# Patient Record
Sex: FEMALE | Race: BLACK OR AFRICAN AMERICAN
[De-identification: names, ages, dates, MRNs, and addresses within clinical notes are randomized per-mention and may not be internally consistent; named-entity substitution may affect disease eponyms.]

---

## 2019-03-10 ENCOUNTER — HOSPITAL ENCOUNTER (EMERGENCY)
Dept: HOSPITAL 61 - ER | Age: 29
Discharge: HOME | End: 2019-03-10
Payer: MEDICAID

## 2019-03-10 VITALS — DIASTOLIC BLOOD PRESSURE: 92 MMHG | SYSTOLIC BLOOD PRESSURE: 128 MMHG

## 2019-03-10 VITALS — HEIGHT: 64 IN | BODY MASS INDEX: 28.34 KG/M2 | WEIGHT: 166 LBS

## 2019-03-10 DIAGNOSIS — Z3A.01: ICD-10-CM

## 2019-03-10 DIAGNOSIS — N88.8: ICD-10-CM

## 2019-03-10 DIAGNOSIS — O20.0: Primary | ICD-10-CM

## 2019-03-10 DIAGNOSIS — O34.81: ICD-10-CM

## 2019-03-10 LAB
APTT PPP: YELLOW S
BACTERIA #/AREA URNS HPF: (no result) /HPF
BILIRUB UR QL STRIP: NEGATIVE
ERYTHROCYTE [DISTWIDTH] IN BLOOD BY AUTOMATED COUNT: 13.1 % (ref 11.5–14.5)
FIBRINOGEN PPP-MCNC: CLEAR MG/DL
HCT VFR BLD CALC: 39.6 % (ref 36–47)
HGB BLD-MCNC: 13.5 G/DL (ref 12–15.5)
MCH RBC QN AUTO: 32 PG (ref 25–35)
MCHC RBC AUTO-ENTMCNC: 34 G/DL (ref 31–37)
MCV RBC AUTO: 93 FL (ref 79–100)
NITRITE UR QL STRIP: NEGATIVE
PH UR STRIP: 5.5 [PH]
PLATELET # BLD AUTO: 273 X10^3/UL (ref 140–400)
PROT UR STRIP-MCNC: NEGATIVE MG/DL
RBC # BLD AUTO: 4.25 X10^6/UL (ref 3.5–5.4)
RBC #/AREA URNS HPF: (no result) /HPF (ref 0–2)
SQUAMOUS #/AREA URNS LPF: (no result) /LPF
UROBILINOGEN UR-MCNC: 0.2 MG/DL
WBC # BLD AUTO: 7.2 X10^3/UL (ref 4–11)
WBC #/AREA URNS HPF: (no result) /HPF (ref 0–4)

## 2019-03-10 PROCEDURE — 85027 COMPLETE CBC AUTOMATED: CPT

## 2019-03-10 PROCEDURE — 84702 CHORIONIC GONADOTROPIN TEST: CPT

## 2019-03-10 PROCEDURE — 76801 OB US < 14 WKS SINGLE FETUS: CPT

## 2019-03-10 PROCEDURE — 81025 URINE PREGNANCY TEST: CPT

## 2019-03-10 PROCEDURE — 86900 BLOOD TYPING SEROLOGIC ABO: CPT

## 2019-03-10 PROCEDURE — 36415 COLL VENOUS BLD VENIPUNCTURE: CPT

## 2019-03-10 PROCEDURE — 81001 URINALYSIS AUTO W/SCOPE: CPT

## 2019-03-10 PROCEDURE — 86901 BLOOD TYPING SEROLOGIC RH(D): CPT

## 2019-03-10 NOTE — RAD
EXAM: Obstetrics sonogram.

 

HISTORY: Spotting and cramping.

 

TECHNIQUE: Transvaginal sonographic imaging of a gravid uterus was 

performed.

 

COMPARISON: None.

 

FINDINGS: The uterus measures 7.4 x 3.7 x 3.6 cm. There is a small 

intrauterine fluid collection with a mean diameter of 6.1 mm. This may be 

a gestational sac with mean sac diameter corresponding with a gestational 

age of 5 weeks and 2 days. No yolk sac or fetal pole is seen. The ovaries 

are normal in size and demonstrate normal blood flow. There is no pelvic 

free fluid. There is nabothian cyst within the cervix.

 

IMPRESSION:

1. Small fluid collection within the endometrial cavity likely due to an 

early gestational sac, corresponding with a gestational age of 5 weeks and

2 days. No yolk sac or fetal pole is seen at this early gestation. 

Short-term sonographic follow-up to confirm viability and exclude a 

pseudosac in the setting of ectopic gestation is recommended.

2. Small nabothian cyst within the cervix.

3. Otherwise, unremarkable pelvic sonogram.

 

Electronically signed by: Sharri Mejía MD (3/10/2019 7:01 PM) [facility]

## 2019-03-10 NOTE — PHYS DOC
Past Medical History


Past Medical History:  Other


Additional Past Medical Histor:  intersistal cystitis


 (ALEXANDER MATIAS)


Past Surgical History:  Other


Additional Past Surgical Histo:  laparotomy


 (ALEXANDER MATIAS)


Alcohol Use:  None


Drug Use:  None


 (ALEXANDER MATIAS)





Adult General


Chief Complaint


Chief Complaint:  VAGINAL BLEEDING PREGNANCY





HPI


HPI





Patient is a 28  year old AA female who presents to the ER with complaints of 

vaginal bleeding during pregnancy x 3 days. Pt states that she was evaluated on 

Thursday at [facility] for vaginal bleeding. Her beta HCG level 

was 2400 on that day and the gestational sac measured 5 weeks 2 days. Her LMP 

was on 1/25/19 and her EDC is 11/1/19. She is G3, P0, A2 with 2 previous 

miscarriages around 6-8 weeks gestation. She reports mild lower abdominal 

cramping currently 2/10 on the pain scale and denies any alleviating or 

exacerbating factors. Pt states her first OB appointment at [facility] is 

not until 3/19/19. She describes the bleeding and dark brown spotting with 

little clots. 


 (ALEXANDER MATIAS)





Review of Systems


Review of Systems





Constitutional: Denies fever or chills []


GI: Denies nausea, vomiting, or diarrhea; see HPI


: Denies dysuria or hematuria; see HPI


GYN: See HPI[]


Musculoskeletal: Denies back pain 


Integument: Denies rash or skin lesions []


Neurologic: Denies headache


 (ALEXANDER MATIAS)





Allergies


Allergies





Allergies








Coded Allergies Type Severity Reaction Last Updated Verified


 


  No Known Drug Allergies    1/30/15 No





 (RISHI ARAMBULA MD)





Physical Exam


Physical Exam





Constitutional: Well developed, well nourished, no acute distress, non-toxic 

appearance. []


HENT: Normocephalic, atraumatic, bilateral external ears normal, nose normal. []


Eyes: conjunctiva normal, no discharge. [] 


Lungs & Thorax: respirations even and unlabored, no retractions. 


 Pelvic Exam: 


 Abdomen:      soft, Nontender


 External Genitalia:   Normal Skin


 Speculum:      Normal vaginal mucosa, OS closed, no cervical discharge, dark 

bloody discharge present in vaginal vault


 Bimanual:       No adnexal masses or tenderness, No CMT


Skin: Warm, dry, no erythema, no rash. [] 


Extremities: No cyanosis, ROM intact, no edema. [] 


Neurologic: Alert and oriented X 3, normal motor function, normal sensory 

function, no focal deficits noted. []


Psychologic: Affect normal, judgement normal, mood normal. []


 (ALEXANDER MATIAS)





Current Patient Data


Vital Signs





 Vital Signs








  Date Time  Temp Pulse Resp B/P (MAP) Pulse Ox O2 Delivery O2 Flow Rate FiO2


 


3/10/19 16:00 99.0 103 20 128/92 (104) 100 Room Air  





 99.0       





 (RISHI ARAMBULA MD)


Lab Values





 Laboratory Tests








Test


 3/10/19


15:20 3/10/19


16:18 3/10/19


16:45


 


POC Urine HCG, Qualitative


 Hcg positive


(Negative) 


 





 


Urine Collection Type  Void   


 


Urine Color  Yellow   


 


Urine Clarity  Clear   


 


Urine pH  5.5   


 


Urine Specific Gravity  1.025   


 


Urine Protein


 


 Negative mg/dL


(NEG-TRACE) 





 


Urine Glucose (UA)


 


 Negative mg/dL


(NEG) 





 


Urine Ketones (Stick)


 


 Negative mg/dL


(NEG) 





 


Urine Blood  Small (NEG)   


 


Urine Nitrite


 


 Negative (NEG)


 





 


Urine Bilirubin


 


 Negative (NEG)


 





 


Urine Urobilinogen Dipstick


 


 0.2 mg/dL (0.2


mg/dL) 





 


Urine Leukocyte Esterase


 


 Negative (NEG)


 





 


Urine RBC


 


 1-2 /HPF (0-2)


 





 


Urine WBC


 


 Rare /HPF


(0-4) 





 


Urine Squamous Epithelial


Cells 


 Few /LPF  


 





 


Urine Bacteria


 


 Few /HPF


(0-FEW) 





 


Urine Mucus  Marked /LPF   


 


White Blood Count


 


 


 7.2 x10^3/uL


(4.0-11.0)


 


Red Blood Count


 


 


 4.25 x10^6/uL


(3.50-5.40)


 


Hemoglobin


 


 


 13.5 g/dL


(12.0-15.5)


 


Hematocrit


 


 


 39.6 %


(36.0-47.0)


 


Mean Corpuscular Volume


 


 


 93 fL ()





 


Mean Corpuscular Hemoglobin   32 pg (25-35)  


 


Mean Corpuscular Hemoglobin


Concent 


 


 34 g/dL


(31-37)


 


Red Cell Distribution Width


 


 


 13.1 %


(11.5-14.5)


 


Platelet Count


 


 


 273 x10^3/uL


(140-400)


 


Maternal Serum HCG Beta


Subunit 


 


 3340 mIU/mL


(0-5)  H





 Laboratory Tests


3/10/19 16:45








 (RISHI ARAMBULA MD)





EKG


EKG


[]


 (ALEXANDER MATIAS)





Radiology/Procedures


Radiology/Procedures


PROCEDURE: PREG 1ST TRIMESTER





EXAM: Obstetrics sonogram.


 


HISTORY: Spotting and cramping.


 


TECHNIQUE: Transvaginal sonographic imaging of a gravid uterus was 


performed.


 


COMPARISON: None.


 


FINDINGS: The uterus measures 7.4 x 3.7 x 3.6 cm. There is a small 


intrauterine fluid collection with a mean diameter of 6.1 mm. This may be 


a gestational sac with mean sac diameter corresponding with a gestational 


age of 5 weeks and 2 days. No yolk sac or fetal pole is seen. The ovaries 


are normal in size and demonstrate normal blood flow. There is no pelvic 


free fluid. There is nabothian cyst within the cervix.


 


IMPRESSION:


1. Small fluid collection within the endometrial cavity likely due to an 


early gestational sac, corresponding with a gestational age of 5 weeks and


2 days. No yolk sac or fetal pole is seen at this early gestation. 


Short-term sonographic follow-up to confirm viability and exclude a 


pseudosac in the setting of ectopic gestation is recommended.


2. Small nabothian cyst within the cervix.


3. Otherwise, unremarkable pelvic sonogram.


 []


 (ALEXANDER MATIAS)





Course & Med Decision Making


Course & Med Decision Making


Pertinent Labs and Imaging studies reviewed. (See chart for details)


Dx: vaginal bleeding in pregnancy, threatened Miscarriage. 


Pt advised of today's beta HCG now 3340. US shows gestational sac measuring 5 

weeks 2 days, the same as previous US. There is no fetal pole visualized. Pt 

instructed to call Dr. Foster's office in the morning for close follow up and 

repeat testing. Return to the ER if symptoms worsen. 


[]


 (ALEXANDER MATIAS)


Course & Med Decision Making





Staff Physician Addendum:


I was working in the ER during the course of this patient's visit.  I was 

available for consultation as needed, but I was not directly involved in the 

care of this patient.    


 (RISHI ARAMBULA MD)


Dragon Disclaimer


Dragon Disclaimer


This electronic medical record was generated, in whole or in part, using a 

voice recognition dictation system.


 (ALEXANDER MATIAS)





Departure


Departure


Impression:  


 Primary Impression:  


 Vaginal bleeding before 22 weeks gestation


 Additional Impression:  


 Threatened miscarriage in early pregnancy


Disposition:  01 HOME, SELF-CARE


Referrals:  


NO PCP (PCP)


Patient Instructions:  Threatened Miscarriage, Easy-to-Read, Vaginal Bleeding 

During Pregnancy, First Trimester





Additional Instructions:  


Today's HCG level was 3340, your blood type is B+. The ultrasound revealed a 

gestational sac measuring 5 weeks 2 days, there was no visualized fetal pole. 

Call Dr. Foster's office in the morning to schedule close follow-up appointment 

for further evaluation of vaginal bleeding during pregnancy. Pelvic rest until 

follow-up appointment. Return to the ER if symptoms worsen.





Problem Qualifiers











ALEXANDER MATIAS Mar 10, 2019 16:49


RISHI ARAMBULA MD Mar 15, 2019 07:50